# Patient Record
Sex: FEMALE | Race: WHITE | NOT HISPANIC OR LATINO | URBAN - METROPOLITAN AREA
[De-identification: names, ages, dates, MRNs, and addresses within clinical notes are randomized per-mention and may not be internally consistent; named-entity substitution may affect disease eponyms.]

---

## 2017-04-20 ENCOUNTER — LAB REQUISITION (OUTPATIENT)
Dept: LAB | Facility: HOSPITAL | Age: 32
End: 2017-04-20
Payer: COMMERCIAL

## 2017-04-20 ENCOUNTER — ALLSCRIPTS OFFICE VISIT (OUTPATIENT)
Dept: OTHER | Facility: OTHER | Age: 32
End: 2017-04-20

## 2017-04-20 DIAGNOSIS — Z01.419 ENCOUNTER FOR GYNECOLOGICAL EXAMINATION WITHOUT ABNORMAL FINDING: ICD-10-CM

## 2017-04-20 PROCEDURE — G0145 SCR C/V CYTO,THINLAYER,RESCR: HCPCS | Performed by: PHYSICIAN ASSISTANT

## 2017-04-20 PROCEDURE — 87624 HPV HI-RISK TYP POOLED RSLT: CPT | Performed by: PHYSICIAN ASSISTANT

## 2017-04-25 LAB — HPV RRNA GENITAL QL NAA+PROBE: NORMAL

## 2017-04-28 LAB
LAB AP GYN PRIMARY INTERPRETATION: NORMAL
LAB AP LMP: NORMAL
Lab: NORMAL

## 2017-05-01 ENCOUNTER — GENERIC CONVERSION - ENCOUNTER (OUTPATIENT)
Dept: OTHER | Facility: OTHER | Age: 32
End: 2017-05-01

## 2017-09-14 ENCOUNTER — ALLSCRIPTS OFFICE VISIT (OUTPATIENT)
Dept: OTHER | Facility: OTHER | Age: 32
End: 2017-09-14

## 2017-09-14 ENCOUNTER — LAB REQUISITION (OUTPATIENT)
Dept: LAB | Facility: HOSPITAL | Age: 32
End: 2017-09-14
Payer: COMMERCIAL

## 2017-09-14 DIAGNOSIS — R35.0 FREQUENCY OF MICTURITION: ICD-10-CM

## 2017-09-14 DIAGNOSIS — R10.2 PELVIC AND PERINEAL PAIN: ICD-10-CM

## 2017-09-14 LAB
BACTERIA UR QL AUTO: NORMAL
CLUE CELL (HISTORICAL): NORMAL
HYPHAL YEAST (HISTORICAL): NORMAL
KOH PREP (HISTORICAL): NORMAL
PH UR STRIP.AUTO: 4 [PH]
TRICHOMONAS (HISTORICAL): NORMAL
YEAST (HISTORICAL): NORMAL

## 2017-09-14 PROCEDURE — 87086 URINE CULTURE/COLONY COUNT: CPT | Performed by: PHYSICIAN ASSISTANT

## 2017-09-14 PROCEDURE — 81001 URINALYSIS AUTO W/SCOPE: CPT | Performed by: PHYSICIAN ASSISTANT

## 2017-09-15 LAB
BACTERIA UR QL AUTO: ABNORMAL /HPF
BILIRUB UR QL STRIP: NEGATIVE
CLARITY UR: CLEAR
COLOR UR: YELLOW
GLUCOSE UR STRIP-MCNC: NEGATIVE MG/DL
HGB UR QL STRIP.AUTO: NEGATIVE
HYALINE CASTS #/AREA URNS LPF: ABNORMAL /LPF
KETONES UR STRIP-MCNC: NEGATIVE MG/DL
LEUKOCYTE ESTERASE UR QL STRIP: ABNORMAL
NITRITE UR QL STRIP: NEGATIVE
NON-SQ EPI CELLS URNS QL MICRO: ABNORMAL /HPF
PH UR STRIP.AUTO: 7 [PH] (ref 4.5–8)
PROT UR STRIP-MCNC: NEGATIVE MG/DL
RBC #/AREA URNS AUTO: ABNORMAL /HPF
SP GR UR STRIP.AUTO: 1.01 (ref 1–1.03)
UROBILINOGEN UR QL STRIP.AUTO: 0.2 E.U./DL
WBC #/AREA URNS AUTO: ABNORMAL /HPF

## 2017-09-16 LAB — BACTERIA UR CULT: NORMAL

## 2017-09-20 ENCOUNTER — GENERIC CONVERSION - ENCOUNTER (OUTPATIENT)
Dept: OTHER | Facility: OTHER | Age: 32
End: 2017-09-20

## 2018-01-11 NOTE — MISCELLANEOUS
Message   Recorded as Task   Date: 09/14/2017 09:18 AM, Created By: Genesis Jones   Task Name: Medical Complaint Callback   Assigned To: Cathy Alonzo   Regarding Patient: Luis Antonio Stafford, Status: In Progress   Comment:    Kristyn Ponce - 14 Sep 2017 9:18 AM     TASK CREATED  Caller: Self; Medical Complaint; [phone] (Home)  Pt calling with complaint of bladder pressure  Pt states she has been waking up at night to urinate   pt states this is very unusual for her  Pt states she has been spotting lately also  The only changes she has made over the last 2 months is drinking a pre workout drink and doing a meal replacement shake for dinner  No other changes  Felicia Backers - 14 Sep 2017 9:24 AM     TASK IN PROGRESS   Felicia Backers - 14 Sep 2017 10:00 AM     TASK REPLIED TO: Previously Assigned To [facility] G[facility],Team  Sw pt she stated she was having pressure and urgency to urinate  said this started sunday and on Monday she spotted a lil blood but then stop she stated she has an history of UTI but this doesn't feel like one, she has itchy and that the urgency to urinate wakes her up at night also has a normal urine amount coming out  She also stated she has a lil stomach pain  Pt was given an appt  today as per Hayley Echeverria @12pm she will come today  Active Problems    1  Encounter for gynecological examination without abnormal finding (V72 31) (Z01 419)    Current Meds   1  Multivitamins TABS; Therapy: (Recorded:20Apr2017) to Recorded   2  Safyral 3-0 03-0 451 MG Oral Tablet (Drospiren-Eth Estrad-Levomefol); take 1 tablet by   mouth every day; Therapy: [id number] to (Evaluate:15Apr2018)  Requested for: 20Apr2017; Last   Rx:20Apr2017 Ordered    Allergies    1   No Known Drug Allergies    Signatures   Electronically signed by : Cort Homans, MA; Sep 14 2017 10:00AM EST                       (Author)

## 2018-01-12 NOTE — RESULT NOTES
Message   Recorded as Task   Date: 08/31/2016 09:26 AM, Created By: Ori Ardon   Task Name: Call Back   Assigned To: Shawn Cummings   Regarding Patient: Harmony Mann, Status: In Progress   Comment:    Loni Nelson - 31 Aug 2016 9:26 AM     TASK CREATED  pt called c/o irregular menses with her bcp  her # [phone]   Shannon Horner - 31 Aug 2016 10:39 AM     TASK IN PROGRESS   Shannon Horner - 31 Aug 2016 11:02 AM     TASK EDITED  Pt tried to prevent menses as she was on vacation - you had told her she could take all active pills so she tried it  Her last active pill was Arnetha Burrs and she started new pill pack Mon  She began with menses yesterday and is going away fri and does not want to be bleeding  She wants YOUR opinion as what to do  Has only taken 3 pills of this new pack  Ayanna Montano - 31 Aug 2016 11:10 AM     TASK REPLIED TO: Previously Assigned To Ayanna Agrawal  She could stop her pill x 4 days then restart, but obviously will be bleeding Friday, Saturday, Sunday, Monday at least - probably too late to try this trick  Not sure what else she can do  Shannon Horner - 98 Aug 2016 11:29 AM     TASK EDITED   Pt probably will just continue taking the pill pack that she is on - as she does not want to waste any pills or purchase another pill pack        Signatures   Electronically signed by :  Alea Haynes, ; Aug 31 2016 11:29AM EST                       (Author)

## 2018-01-12 NOTE — MISCELLANEOUS
Message   Recorded as Task   Date: 09/20/2017 01:33 PM, Created By: María Brito   Task Name: Medical Complaint Callback   Assigned To: Eva Isaac   Regarding Patient: Christian Wheeler, Status: In Progress   Comment:    Myranda Phan - 20 Sep 2017 1:33 PM     TASK CREATED  Caller: Self; Medical Complaint; [phone] (Home); [phone] (Work)  pt is calling for results of appt 9/14; she is feeling a little better  she is  @ [phone]  [facility] Peer - 20 Sep 2017 2:23 PM     TASK IN PROGRESS   Leetta Peer - 20 Sep 2017 2:30 PM     TASK EDITED  Pt is feeling much better - has not had u/s done   U/A neg  If pain comes back, will go for u/s        Active Problems    1  Encounter for gynecological examination without abnormal finding (V72 31) (Z01 419)   2  Pelvic pain (R10 2)   3  Pelvic pressure in female (625 8) (N94 89)   4  Urinary frequency (788 41) (R35 0)    Current Meds   1  Multivitamins TABS; Therapy: (Recorded:20Apr2017) to Recorded   2  Safyral 3-0 03-0 451 MG Oral Tablet (Drospiren-Eth Estrad-Levomefol); take 1 tablet by   mouth every day; Therapy: [id number] to (Evaluate:15Apr2018)  Requested for: 20Apr2017; Last   Rx:20Apr2017 Ordered    Allergies    1  No Known Drug Allergies    Signatures   Electronically signed by :  Nancy Haynes, ; Sep 20 2017  2:31PM EST                       (Author)

## 2018-01-13 VITALS
SYSTOLIC BLOOD PRESSURE: 108 MMHG | BODY MASS INDEX: 20.24 KG/M2 | DIASTOLIC BLOOD PRESSURE: 66 MMHG | HEIGHT: 62 IN | WEIGHT: 110 LBS

## 2018-01-14 VITALS — WEIGHT: 116 LBS | BODY MASS INDEX: 21.56 KG/M2 | DIASTOLIC BLOOD PRESSURE: 74 MMHG | SYSTOLIC BLOOD PRESSURE: 124 MMHG

## 2018-04-03 RX ORDER — DROSPIRENONE/ETHINYL ESTRADIOL/LEVOMEFOLATE CALCIUM AND LEVOMEFOLATE CALCIUM 3-0.03(21)
KIT ORAL
Qty: 84 TABLET | Refills: 3 | OUTPATIENT
Start: 2018-04-03

## 2018-04-09 ENCOUNTER — TELEPHONE (OUTPATIENT)
Dept: OBGYN CLINIC | Facility: CLINIC | Age: 33
End: 2018-04-09

## 2018-04-09 DIAGNOSIS — Z78.9 USES BIRTH CONTROL: Primary | ICD-10-CM

## 2018-04-09 DIAGNOSIS — Z30.09 BIRTH CONTROL COUNSELING: Primary | ICD-10-CM

## 2018-04-09 RX ORDER — DROSPIRENONE/ETHINYL ESTRADIOL/LEVOMEFOLATE CALCIUM AND LEVOMEFOLATE CALCIUM 3-0.03(21)
KIT ORAL
Qty: 90 TABLET | Refills: 1 | Status: SHIPPED | OUTPATIENT
Start: 2018-04-09

## 2018-04-09 RX ORDER — DROSPIRENONE/ETHINYL ESTRADIOL/LEVOMEFOLATE CALCIUM AND LEVOMEFOLATE CALCIUM 3-0.03(21)
KIT ORAL
Qty: 84 TABLET | Refills: 3 | OUTPATIENT
Start: 2018-04-09

## 2018-04-09 RX ORDER — DROSPIRENONE/ETHINYL ESTRADIOL/LEVOMEFOLATE CALCIUM AND LEVOMEFOLATE CALCIUM 3-0.03(21)
KIT ORAL
COMMUNITY
Start: 2018-03-07 | End: 2018-04-09 | Stop reason: SDUPTHER

## 2018-04-09 RX ORDER — DROSPIRENONE/ETHINYL ESTRADIOL/LEVOMEFOLATE CALCIUM AND LEVOMEFOLATE CALCIUM 3-0.03(21)
1 KIT ORAL DAILY
Qty: 28 TABLET | Refills: 0 | Status: CANCELLED | OUTPATIENT
Start: 2018-04-09

## 2018-04-09 NOTE — TELEPHONE ENCOUNTER
Pt called - said we called in a BC rx for her - but for 1pk it's now costing $70 dollars - she needs something else called in for her TODAY - Requesting call back Today as well

## 2018-04-10 NOTE — TELEPHONE ENCOUNTER
Called pt pharmacy - they are going to fill the generic - pt is ok with this and will pick it up today

## 2018-04-10 NOTE — TELEPHONE ENCOUNTER
This is the same pill she has been on for over a year  Can we see if we call in 3 months worth whether it is cheaper for her? Sometimes the issue is this one pack being sent in, instead of a 3 month supply

## 2020-09-24 ENCOUNTER — TELEPHONE (OUTPATIENT)
Dept: SURGICAL ONCOLOGY | Facility: CLINIC | Age: 35
End: 2020-09-24

## 2020-09-24 NOTE — TELEPHONE ENCOUNTER
New Patient Encounter    New Patient Intake Form   Patient Details:  Kamari Allen  1985  [id number]    Background Information:  [address] starts by opening a telephone encounter and gathering the following information   Who is calling to schedule? If not self, relationship to patient? self   Referring Provider fertility providers    What is the diagnosis? Possible blood clotting disorders  Pt has had failed IVF transfers  Fertility providers would like an opinion from a hematologist     Is this diagnosis confirmed? Yes   When was the diagnosis? na   Is there a confirmed diagnosis from a biopsy/tissue reviewed by pathology? na   Were outside slides requested? Yes   Is patient aware of diagnosis? Yes   Is there a personal history and what kind? na   Is there a family history and what kind? na   Reason for visit? New Diagnosis   Have you had any imaging or labs done? If so: when, where? yes  [facility]   Are records in EPIC? yes   If patient has a prior history of breast cancer were old records obtained? NA   Was the patient told to bring a disk? no   Does the patient smoke or Vape? no   If yes, how many packs or cartridges per day? na   Scheduling Information:   Preferred [place]: [facility]     Are there any dates/time the patient cannot be seen? na   Miscellaneous: Confirmed pt insurance information but RTE is still returning E- rejected  After completing the above information, please route to Financial Counselor and the appropriate Nurse Navigator for review

## 2020-10-12 ENCOUNTER — CONSULT (OUTPATIENT)
Dept: HEMATOLOGY ONCOLOGY | Facility: HOSPITAL | Age: 35
End: 2020-10-12
Payer: COMMERCIAL

## 2020-10-12 VITALS
TEMPERATURE: 97.8 F | SYSTOLIC BLOOD PRESSURE: 100 MMHG | RESPIRATION RATE: 16 BRPM | DIASTOLIC BLOOD PRESSURE: 62 MMHG | HEIGHT: 62 IN | BODY MASS INDEX: 23 KG/M2 | WEIGHT: 125 LBS

## 2020-10-12 DIAGNOSIS — D68.59 HYPERCOAGULABLE STATE (HCC): Primary | ICD-10-CM

## 2020-10-12 PROCEDURE — 99245 OFF/OP CONSLTJ NEW/EST HI 55: CPT | Performed by: INTERNAL MEDICINE

## 2020-10-12 RX ORDER — SILDENAFIL 25 MG/1
25 TABLET, FILM COATED ORAL 4 TIMES DAILY
COMMUNITY

## 2020-10-12 RX ORDER — LEVOTHYROXINE SODIUM 0.07 MG/1
50 TABLET ORAL
COMMUNITY
Start: 2020-10-10

## 2020-10-12 RX ORDER — PNV NO.95/FERROUS FUM/FOLIC AC 28MG-0.8MG
1 TABLET ORAL
COMMUNITY

## 2020-10-12 RX ORDER — ASPIRIN 81 MG/1
81 TABLET ORAL DAILY
COMMUNITY

## 2020-10-12 RX ORDER — RIZATRIPTAN BENZOATE 10 MG/1
10 TABLET ORAL
COMMUNITY
Start: 2020-06-02 | End: 2021-06-02

## 2020-10-15 LAB
APCR PPP: 5.3 RATIO
APTT PPP: 27 SEC (ref 23–32)
AT III ACT/NOR PPP CHRO: 97 % NORMAL (ref 80–135)
HCYS SERPL-SCNC: 6.6 UMOL/L
INR PPP: 1
PROT C ACT/NOR PPP: 114 % NORMAL (ref 70–180)
PROT S ACT/NOR PPP: 44 % NORMAL (ref 60–140)
PROTHROMBIN TIME: 10.4 SEC (ref 9–11.5)
SL AMB FINAL INTERPRETATION: ABNORMAL

## 2020-10-28 ENCOUNTER — OFFICE VISIT (OUTPATIENT)
Dept: HEMATOLOGY ONCOLOGY | Facility: HOSPITAL | Age: 35
End: 2020-10-28
Payer: COMMERCIAL

## 2020-10-28 VITALS
SYSTOLIC BLOOD PRESSURE: 108 MMHG | OXYGEN SATURATION: 98 % | HEART RATE: 66 BPM | RESPIRATION RATE: 16 BRPM | BODY MASS INDEX: 23.19 KG/M2 | DIASTOLIC BLOOD PRESSURE: 74 MMHG | TEMPERATURE: 97.8 F | HEIGHT: 62 IN | WEIGHT: 126 LBS

## 2020-10-28 DIAGNOSIS — D68.59 HYPERCOAGULABLE STATE (HCC): Primary | ICD-10-CM

## 2020-10-28 DIAGNOSIS — D68.59 PROTEIN S DEFICIENCY (HCC): ICD-10-CM

## 2020-10-28 PROCEDURE — 99214 OFFICE O/P EST MOD 30 MIN: CPT | Performed by: INTERNAL MEDICINE

## 2020-11-02 ENCOUNTER — TELEPHONE (OUTPATIENT)
Dept: HEMATOLOGY ONCOLOGY | Facility: CLINIC | Age: 35
End: 2020-11-02

## 2021-05-04 ENCOUNTER — OFFICE VISIT (OUTPATIENT)
Dept: ENDOCRINOLOGY | Facility: CLINIC | Age: 36
End: 2021-05-04
Payer: COMMERCIAL

## 2021-05-04 VITALS
DIASTOLIC BLOOD PRESSURE: 64 MMHG | BODY MASS INDEX: 22.08 KG/M2 | HEIGHT: 62 IN | SYSTOLIC BLOOD PRESSURE: 110 MMHG | WEIGHT: 120 LBS | HEART RATE: 64 BPM

## 2021-05-04 DIAGNOSIS — R94.6 ABNORMAL THYROID FUNCTION TEST: Primary | ICD-10-CM

## 2021-05-04 DIAGNOSIS — E28.2 PCOS (POLYCYSTIC OVARIAN SYNDROME): ICD-10-CM

## 2021-05-04 PROCEDURE — 99244 OFF/OP CNSLTJ NEW/EST MOD 40: CPT | Performed by: INTERNAL MEDICINE

## 2021-05-04 RX ORDER — SERTRALINE HYDROCHLORIDE 25 MG/1
25 TABLET, FILM COATED ORAL DAILY
COMMUNITY
Start: 2020-11-25 | End: 2021-11-25

## 2021-05-04 RX ORDER — LEVOTHYROXINE SODIUM 0.05 MG/1
50 TABLET ORAL DAILY
COMMUNITY
Start: 2021-04-22

## 2021-05-04 NOTE — LETTER
May 4, 2021     [facility], [address] [facility] [address]    Patient: Alice Smith   YOB: 1985   Date of Visit: 5/4/2021       Dear Dr Earnest Lopez: Thank you for referring [facility] to me for evaluation  Below are my notes for this consultation  If you have questions, please do not hesitate to call me  I look forward to following your patient along with you  Sincerely,        Matt Alberto MD        CC: DO Matt Fuller MD  5/4/2021 11:12 AM  Sign when Signing Visit   Alice Smith 28 y o  female MRN: [id number]    Encounter: [id number]      Assessment/Plan     Assessment: This is a 28y o -year-old female with abnormal thyroid function testing and PCOS  Plan:  1  Abnormal thyroid testing- check TSH, free T4, total T3, thyroid antibodies  Await previous results from her reproductive endocrinologist     2   Polycystic ovarian syndrome- check total and free testosterone level  CC:     Abnormal thyroid function testing    History of Present Illness     HPI:   49-year-old woman who has been undergoing IVF over the last few years presents for consultation related to abnormal thyroid function testing  I do not have the results from the prior testing to know what the abnormality is at this time  She denies any symptoms of hypo or hyperthyroidism  She does admit to facial hair growth, abdominal hair growth and some chest hair growth  She was diagnosed with polycystic ovarian syndrome in 2019  Review of Systems   Constitutional: Negative for chills and fever  Respiratory: Negative for shortness of breath  Cardiovascular: Negative for chest pain  Gastrointestinal: Negative for constipation, diarrhea, nausea and vomiting  Endocrine: Negative for cold intolerance and heat intolerance  All other systems reviewed and are negative        Historical Information   Past Medical History:   Diagnosis Date    Abnormal cervical Papanicolaou smear     last assessed-1/30/2014    Migraine     Sleep disturbance      No past surgical history on file  Social History   Social History     Substance and Sexual Activity   Alcohol Use Yes    Comment: social     Social History     Substance and Sexual Activity   Drug Use No     Social History     Tobacco Use   Smoking Status Never Smoker   Smokeless Tobacco Never Used     Family History:   Family History   Problem Relation Age of Onset    Hypertension Sister     Hypertension Brother     Breast cancer Paternal Grandmother     Anxiety disorder Family         anxiety (symptom)    Depression Family     Migraines Family         migraine headache    Sleep disorder Family         sleep disturbances    Thyroid disease Family        Meds/Allergies   Current Outpatient Medications   Medication Sig Dispense Refill    aspirin (ECOTRIN LOW STRENGTH) 81 mg EC tablet Take 81 mg by mouth daily      Cholecalciferol 10 MCG/ML LIQD Take 1 tablet by mouth      Prenatal Vit-Fe Fumarate-FA (prenatal vitamin) 28-0 8 mg Take 1 tablet by mouth      sertraline (ZOLOFT) 25 mg tablet Take 25 mg by mouth daily      levothyroxine 50 mcg tablet Take 50 mcg by mouth daily      levothyroxine 75 mcg tablet 50 mcg       rizatriptan (MAXALT) 10 MG tablet Take 10 mg by mouth      SAFYRAL 3-0 03-0 451 MG TABS Take one tab daily (Patient not taking: Reported on 5/4/2021) 90 tablet 1    sildenafil (VIAGRA) 25 MG tablet Take 25 mg by mouth 4 (four) times a day       No current facility-administered medications for this visit  Allergies   Allergen Reactions    Pollen Extract        Objective   Vitals: Blood pressure 110/64, pulse 64, height 5' 2" (1 575 m), weight 54 4 kg (120 lb)  Physical Exam  Vitals signs reviewed  Constitutional:       General: She is not in acute distress  Appearance: She is well-developed  She is not diaphoretic  HENT:      Head: Normocephalic and atraumatic     Eyes:      General: Lids are normal  No scleral icterus  Right eye: No discharge  Left eye: No discharge  Conjunctiva/sclera: Conjunctivae normal    Neck:      Musculoskeletal: Neck supple  Thyroid: No thyromegaly  Cardiovascular:      Rate and Rhythm: Normal rate and regular rhythm  Heart sounds: Normal heart sounds  No murmur  No friction rub  No gallop  Pulmonary:      Effort: Pulmonary effort is normal  No respiratory distress  Breath sounds: Normal breath sounds  No wheezing  Abdominal:      General: Bowel sounds are normal  There is no distension  Palpations: Abdomen is soft  Tenderness: There is no abdominal tenderness  Musculoskeletal: Normal range of motion  General: No tenderness or deformity  Lymphadenopathy:      Head:      Right side of head: No occipital adenopathy  Left side of head: No occipital adenopathy  Upper Body:      Right upper body: No supraclavicular adenopathy  Left upper body: No supraclavicular adenopathy  Skin:     General: Skin is warm  Findings: No erythema or rash  Comments: She does have mild facial hair growth, hair growth on the lower back as well as midline of the abdomen  Neurological:      Mental Status: She is alert and oriented to person, place, and time  Cranial Nerves: No cranial nerve deficit  Psychiatric:         Mood and Affect: Mood normal          Behavior: Behavior normal          The history was obtained from the review of the chart, patient  Portions of the record may have been created with voice recognition software  Occasional wrong word or "sound a like" substitutions may have occurred due to the inherent limitations of voice recognition software  Read the chart carefully and recognize, using context, where substitutions have occurred

## 2021-05-04 NOTE — PROGRESS NOTES
Zoya Pappas 28 y o  female MRN: [id number]    Encounter: [id number]      Assessment/Plan     Assessment: This is a 28y o -year-old female with abnormal thyroid function testing and PCOS  Plan:  1  Abnormal thyroid testing- check TSH, free T4, total T3, thyroid antibodies  Await previous results from her reproductive endocrinologist     2   Polycystic ovarian syndrome- check total and free testosterone level  CC:     Abnormal thyroid function testing    History of Present Illness     HPI:   70-year-old woman who has been undergoing IVF over the last few years presents for consultation related to abnormal thyroid function testing  I do not have the results from the prior testing to know what the abnormality is at this time  She denies any symptoms of hypo or hyperthyroidism  She does admit to facial hair growth, abdominal hair growth and some chest hair growth  She was diagnosed with polycystic ovarian syndrome in 2019  Review of Systems   Constitutional: Negative for chills and fever  Respiratory: Negative for shortness of breath  Cardiovascular: Negative for chest pain  Gastrointestinal: Negative for constipation, diarrhea, nausea and vomiting  Endocrine: Negative for cold intolerance and heat intolerance  All other systems reviewed and are negative  Historical Information   Past Medical History:   Diagnosis Date    Abnormal cervical Papanicolaou smear     last assessed-1/30/2014    Migraine     Sleep disturbance      No past surgical history on file    Social History   Social History     Substance and Sexual Activity   Alcohol Use Yes    Comment: social     Social History     Substance and Sexual Activity   Drug Use No     Social History     Tobacco Use   Smoking Status Never Smoker   Smokeless Tobacco Never Used     Family History:   Family History   Problem Relation Age of Onset    Hypertension Sister     Hypertension Brother     Breast cancer Paternal Grandmother     Anxiety disorder Family         anxiety (symptom)    Depression Family     Migraines Family         migraine headache    Sleep disorder Family         sleep disturbances    Thyroid disease Family        Meds/Allergies   Current Outpatient Medications   Medication Sig Dispense Refill    aspirin (ECOTRIN LOW STRENGTH) 81 mg EC tablet Take 81 mg by mouth daily      Cholecalciferol 10 MCG/ML LIQD Take 1 tablet by mouth      Prenatal Vit-Fe Fumarate-FA (prenatal vitamin) 28-0 8 mg Take 1 tablet by mouth      sertraline (ZOLOFT) 25 mg tablet Take 25 mg by mouth daily      levothyroxine 50 mcg tablet Take 50 mcg by mouth daily      levothyroxine 75 mcg tablet 50 mcg       rizatriptan (MAXALT) 10 MG tablet Take 10 mg by mouth      SAFYRAL 3-0 03-0 451 MG TABS Take one tab daily (Patient not taking: Reported on 5/4/2021) 90 tablet 1    sildenafil (VIAGRA) 25 MG tablet Take 25 mg by mouth 4 (four) times a day       No current facility-administered medications for this visit  Allergies   Allergen Reactions    Pollen Extract        Objective   Vitals: Blood pressure 110/64, pulse 64, height 5' 2" (1 575 m), weight 54 4 kg (120 lb)  Physical Exam  Vitals signs reviewed  Constitutional:       General: She is not in acute distress  Appearance: She is well-developed  She is not diaphoretic  HENT:      Head: Normocephalic and atraumatic  Eyes:      General: Lids are normal  No scleral icterus  Right eye: No discharge  Left eye: No discharge  Conjunctiva/sclera: Conjunctivae normal    Neck:      Musculoskeletal: Neck supple  Thyroid: No thyromegaly  Cardiovascular:      Rate and Rhythm: Normal rate and regular rhythm  Heart sounds: Normal heart sounds  No murmur  No friction rub  No gallop  Pulmonary:      Effort: Pulmonary effort is normal  No respiratory distress  Breath sounds: Normal breath sounds  No wheezing     Abdominal:      General: Bowel sounds are normal  There is no distension  Palpations: Abdomen is soft  Tenderness: There is no abdominal tenderness  Musculoskeletal: Normal range of motion  General: No tenderness or deformity  Lymphadenopathy:      Head:      Right side of head: No occipital adenopathy  Left side of head: No occipital adenopathy  Upper Body:      Right upper body: No supraclavicular adenopathy  Left upper body: No supraclavicular adenopathy  Skin:     General: Skin is warm  Findings: No erythema or rash  Comments: She does have mild facial hair growth, hair growth on the lower back as well as midline of the abdomen  Neurological:      Mental Status: She is alert and oriented to person, place, and time  Cranial Nerves: No cranial nerve deficit  Psychiatric:         Mood and Affect: Mood normal          Behavior: Behavior normal          The history was obtained from the review of the chart, patient  Portions of the record may have been created with voice recognition software  Occasional wrong word or "sound a like" substitutions may have occurred due to the inherent limitations of voice recognition software  Read the chart carefully and recognize, using context, where substitutions have occurred

## 2021-05-06 ENCOUNTER — TELEPHONE (OUTPATIENT)
Dept: ENDOCRINOLOGY | Facility: CLINIC | Age: 36
End: 2021-05-06

## 2021-05-06 NOTE — TELEPHONE ENCOUNTER
Pt called to let you know that she is planning to have her labs done  Tomorrow,  Pt  started taking   Estrogen 2 mg BID on 05/05/2021 and would like to know if this will affect her lab results

## 2021-05-11 LAB
T3 SERPL-MCNC: 53 NG/DL (ref 76–181)
T4 FREE SERPL-MCNC: 1.1 NG/DL (ref 0.8–1.8)
TESTOST FREE SERPL-MCNC: 5.5 PG/ML (ref 0.1–6.4)
TESTOST SERPL-MCNC: 60 NG/DL (ref 2–45)
THYROGLOB AB SERPL-ACNC: <1 IU/ML
THYROPEROXIDASE AB SERPL-ACNC: <1 IU/ML
TSH SERPL-ACNC: 0.41 MIU/L

## 2021-05-12 ENCOUNTER — TELEPHONE (OUTPATIENT)
Dept: ENDOCRINOLOGY | Facility: CLINIC | Age: 36
End: 2021-05-12

## 2021-05-12 NOTE — TELEPHONE ENCOUNTER
----- Message from Chan Pena MD sent at 5/12/2021 10:29 AM EDT -----  TSH is in the target range  No need for thyroid hormone replacement at this time  No evidence of thyroid being attacked based on normal antibody testing  Testosterone is slightly elevated as expected with PCOS  T3 is slightly low but  Unlikely playing any significant clinical role        Sent to my chart

## 2021-05-12 NOTE — RESULT ENCOUNTER NOTE
TSH is in the target range  No need for thyroid hormone replacement at this time  No evidence of thyroid being attacked based on normal antibody testing  Testosterone is slightly elevated as expected with PCOS  T3 is slightly low but  Unlikely playing any significant clinical role        Sent to my chart

## 2021-06-03 ENCOUNTER — TELEPHONE (OUTPATIENT)
Dept: ENDOCRINOLOGY | Facility: CLINIC | Age: 36
End: 2021-06-03

## 2021-06-08 ENCOUNTER — TELEPHONE (OUTPATIENT)
Dept: HEMATOLOGY ONCOLOGY | Facility: CLINIC | Age: 36
End: 2021-06-08

## 2021-06-08 NOTE — TELEPHONE ENCOUNTER
Patient calling to see if Dr Emmy Augustine will order Lovenox for her  She will be proceeding with another round of IVF  Previous MD at [facility] prescribed this for patient  That MD is no longer with the practice  New Physician will not prescribe Lovenox for patient as it is "not part of their process"      Patient is asking if Dr Emmy Augustine can prescribe Lovenox for her so she can proceed with another round of IVF    Will send to RN to discuss with Dr Emmy Augustine  Call back number for patient

## 2021-06-09 DIAGNOSIS — D68.59 PROTEIN S DEFICIENCY (HCC): Primary | ICD-10-CM

## 2021-06-09 NOTE — TELEPHONE ENCOUNTER
I called and spoke with patient  I advised her that Dr Ghada Jones did call and leave a message for her physician  I also advised her that Dr Ghada Jones does wish for her to have a repeat lab done  I did send to her a copy of the script via my chart  Patient is aware and verbalized understanding

## 2021-06-16 LAB
PROT S ACT/NOR PPP: 103 % NORMAL (ref 60–140)
PROT S AG ACT/NOR PPP IA: 78 % NORMAL (ref 70–140)